# Patient Record
Sex: MALE | Race: WHITE | NOT HISPANIC OR LATINO
[De-identification: names, ages, dates, MRNs, and addresses within clinical notes are randomized per-mention and may not be internally consistent; named-entity substitution may affect disease eponyms.]

---

## 2019-11-16 ENCOUNTER — TRANSCRIPTION ENCOUNTER (OUTPATIENT)
Age: 29
End: 2019-11-16

## 2023-12-15 PROBLEM — Z00.00 ENCOUNTER FOR PREVENTIVE HEALTH EXAMINATION: Status: ACTIVE | Noted: 2023-12-15

## 2023-12-19 ENCOUNTER — APPOINTMENT (OUTPATIENT)
Dept: PULMONOLOGY | Facility: CLINIC | Age: 33
End: 2023-12-19
Payer: MEDICAID

## 2023-12-19 VITALS
DIASTOLIC BLOOD PRESSURE: 78 MMHG | HEIGHT: 70 IN | TEMPERATURE: 97.1 F | OXYGEN SATURATION: 97 % | BODY MASS INDEX: 30.06 KG/M2 | WEIGHT: 210 LBS | SYSTOLIC BLOOD PRESSURE: 121 MMHG | HEART RATE: 77 BPM

## 2023-12-19 DIAGNOSIS — J45.901 UNSPECIFIED ASTHMA WITH (ACUTE) EXACERBATION: ICD-10-CM

## 2023-12-19 PROCEDURE — 94010 BREATHING CAPACITY TEST: CPT

## 2023-12-19 PROCEDURE — 99204 OFFICE O/P NEW MOD 45 MIN: CPT | Mod: 25

## 2023-12-19 RX ORDER — FLUTICASONE FUROATE, UMECLIDINIUM BROMIDE AND VILANTEROL TRIFENATATE 100; 62.5; 25 UG/1; UG/1; UG/1
100-62.5-25 POWDER RESPIRATORY (INHALATION)
Qty: 1 | Refills: 11 | Status: ACTIVE | COMMUNITY
Start: 2023-12-19 | End: 1900-01-01

## 2023-12-21 NOTE — PHYSICAL EXAM
[No Acute Distress] : no acute distress [Normal Appearance] : normal appearance [No Neck Mass] : no neck mass [Normal Rate/Rhythm] : normal rate/rhythm [Normal S1, S2] : normal s1, s2 [No Murmurs] : no murmurs [No Resp Distress] : no resp distress [Clear to Auscultation Bilaterally] : clear to auscultation bilaterally [No Abnormalities] : no abnormalities [Benign] : benign [Normal Gait] : normal gait [No Clubbing] : no clubbing [No Cyanosis] : no cyanosis [No Edema] : no edema [FROM] : FROM [Normal Color/ Pigmentation] : normal color/ pigmentation [No Focal Deficits] : no focal deficits [Oriented x3] : oriented x3 [Normal Affect] : normal affect [TextBox_11] : huge turbinates blocking the nares

## 2023-12-21 NOTE — HISTORY OF PRESENT ILLNESS
[TextBox_4] : 7 years. exposed to a smell  on trelgy acid reflux and acid reflux takes nexium does not control break though reflux has sleep apnea still has problems with shortness nasal spray not working snores sees a sleep speciliast on trelegy. clear lungs ent only gave sprays big turbinates

## 2023-12-21 NOTE — DISCUSSION/SUMMARY
[FreeTextEntry1] : it is unclear if his asthma is not in good control or he just feels dyspnea due to nasal obstruction will have him see ent, i thinnk he needs a turbinectomy  will refill trelegy

## 2024-01-12 ENCOUNTER — APPOINTMENT (OUTPATIENT)
Dept: OTOLARYNGOLOGY | Facility: CLINIC | Age: 34
End: 2024-01-12
Payer: MEDICAID

## 2024-01-12 VITALS — WEIGHT: 210 LBS | BODY MASS INDEX: 30.06 KG/M2 | HEIGHT: 70 IN

## 2024-01-12 DIAGNOSIS — J34.3 HYPERTROPHY OF NASAL TURBINATES: ICD-10-CM

## 2024-01-12 DIAGNOSIS — J35.2 HYPERTROPHY OF ADENOIDS: ICD-10-CM

## 2024-01-12 DIAGNOSIS — J34.89 OTHER SPECIFIED DISORDERS OF NOSE AND NASAL SINUSES: ICD-10-CM

## 2024-01-12 PROCEDURE — 99204 OFFICE O/P NEW MOD 45 MIN: CPT | Mod: 25

## 2024-01-12 PROCEDURE — 31231 NASAL ENDOSCOPY DX: CPT

## 2024-01-12 RX ORDER — FLUTICASONE PROPIONATE 50 UG/1
50 SPRAY, METERED NASAL DAILY
Qty: 1 | Refills: 4 | Status: ACTIVE | COMMUNITY
Start: 2024-01-12 | End: 1900-01-01

## 2024-01-12 NOTE — PHYSICAL EXAM
[Nasal Endoscopy Performed] : nasal endoscopy was performed, see procedure section for findings [Midline] : trachea located in midline position [de-identified] : crowded opx, large tongue base [Normal] : no rashes

## 2024-01-12 NOTE — ASSESSMENT
[FreeTextEntry1] : 33M here for initial evaluation. For years, he c/o nasal congestion/obstruction. Though this varies in severity, the right side is usually more affected than the left. There is postnasal drip and feeling of heavy, restricted nasal breathing. He has been on multiple nasal sprays in the past without improvement. Allergy testing is negative. There is no h/o sinusitis. He is on daily nexium for years. There is loud nighttime snoring. He has asthma, controlled. On exam, oropharynx is crowded. Nasal endoscopy shows right septal deviation with inferior turbinate hypertrophy and moderate adenoid hypertrophy w overlying clear mucus; the turbinate decongest well with improvement in breathing. Nasal obstruction primarily due to rhinitis/turbinate hypertrophy; right side worse due to obvious septal deviation. There is also a component of adenoid hypertrophy/adenoiditis as well. For now, restart flonase BID and amari get CT sinus in followup. Should he remain sx then, likely recommend turbinate reduction/septoplasty w adenoidectomy.

## 2024-01-12 NOTE — HISTORY OF PRESENT ILLNESS
[de-identified] : 33M here for initial evaluation.  For years, he c/o nasal congestion/obstruction. Though this varies in severity, the right side is usually more affected than the left. He has been on multiple nasal sprays in the past without improvement. Allergy testing is negative. There is postnasal drip and feeling of heavy, restricted nasal breathing. There is no h/o sinusitis. He is on daily nexium for years. There is loud nighttime snoring. Has asthma, controlled. No imaging.  ROS otherwise unremarkable.

## 2024-01-12 NOTE — CONSULT LETTER
[Dear  ___] : Dear  [unfilled], [Courtesy Letter:] : I had the pleasure of seeing your patient, [unfilled], in my office today. [Consult Closing:] : Thank you very much for allowing me to participate in the care of this patient.  If you have any questions, please do not hesitate to contact me. [Sincerely,] : Sincerely, [FreeTextEntry3] : Gee Bacon MD Department of Otolaryngology - Head and Neck Surgery

## 2024-01-12 NOTE — PROCEDURE
[FreeTextEntry3] : Nasal Endoscopy: right septal deviation inferior turbinate hypertrophy -> decongest well middle meati patent, no mucopus/polyps moderate adenoid hypertrophy w overlying clear mucus